# Patient Record
Sex: FEMALE | Race: ASIAN | NOT HISPANIC OR LATINO | URBAN - METROPOLITAN AREA
[De-identification: names, ages, dates, MRNs, and addresses within clinical notes are randomized per-mention and may not be internally consistent; named-entity substitution may affect disease eponyms.]

---

## 2022-08-07 ENCOUNTER — EMERGENCY (EMERGENCY)
Facility: HOSPITAL | Age: 47
LOS: 1 days | Discharge: HOME | End: 2022-08-07
Attending: EMERGENCY MEDICINE
Payer: SELF-PAY

## 2022-08-07 VITALS
OXYGEN SATURATION: 99 % | RESPIRATION RATE: 15 BRPM | HEART RATE: 71 BPM | SYSTOLIC BLOOD PRESSURE: 155 MMHG | DIASTOLIC BLOOD PRESSURE: 91 MMHG | TEMPERATURE: 98 F

## 2022-08-07 VITALS
DIASTOLIC BLOOD PRESSURE: 98 MMHG | OXYGEN SATURATION: 100 % | RESPIRATION RATE: 18 BRPM | HEART RATE: 83 BPM | TEMPERATURE: 98 F | WEIGHT: 190.92 LBS | SYSTOLIC BLOOD PRESSURE: 167 MMHG | HEIGHT: 68 IN

## 2022-08-07 PROCEDURE — 99284 EMERGENCY DEPT VISIT MOD MDM: CPT

## 2022-08-07 PROCEDURE — 82962 GLUCOSE BLOOD TEST: CPT

## 2022-08-07 PROCEDURE — 99053 MED SERV 10PM-8AM 24 HR FAC: CPT

## 2022-08-07 RX ORDER — VALACYCLOVIR 500 MG/1
1000 TABLET, FILM COATED ORAL ONCE
Refills: 0 | Status: COMPLETED | OUTPATIENT
Start: 2022-08-07 | End: 2022-08-07

## 2022-08-07 RX ORDER — VALACYCLOVIR 500 MG/1
1 TABLET, FILM COATED ORAL
Qty: 21 | Refills: 0
Start: 2022-08-07 | End: 2022-08-13

## 2022-08-07 RX ADMIN — VALACYCLOVIR 1000 MILLIGRAM(S): 500 TABLET, FILM COATED ORAL at 03:39

## 2022-08-07 RX ADMIN — Medication 60 MILLIGRAM(S): at 03:40

## 2022-08-07 NOTE — ED ADULT TRIAGE NOTE - CHIEF COMPLAINT QUOTE
L sided facial droop first noticed approx 2230 after dripping water out of mouth around 2030, states was normal prior

## 2022-08-07 NOTE — ED PROVIDER NOTE - NSFOLLOWUPINSTRUCTIONS_ED_ALL_ED_FT
(1) Follow up with your primary care physician as discussed. In addition, we did not find evidence of a life threatening illness on your testing here today. Please call your physician or [phone] to set up the necessary appointments.  (2) Immediately seek care at your nearest emergency room if your symptoms worsen, persist, or do not resolve. This includes new weakness, vision changes, dizziness.  (3) Take prednisone 3 pills once a day and valacyclovir 1 pill 3 times per day for 1 week as instructed.  (4) You can use over the counter eye patch and visine for your affected eye to keep it from drying out.      Malcolm's Palsy, Adult       Bell's palsy is a short-term inability to move muscles in a part of the face. The inability to move, also called paralysis, results from inflammation or compression of the seventh cranial nerve. This nerve travels along the skull and under the ear to the side of the face. This nerve is responsible for facial movements that include blinking, closing the eyes, smiling, and frowning.      What are the causes?    The exact cause of this condition is not known. It may be caused by an infection from a virus, such as the chickenpox (herpes zoster), Claire–Barr, or mumps virus.      What increases the risk?    You are more likely to develop this condition if:  •You are pregnant.      •You have diabetes.      •You have had a recent infection in your nose, throat, or airways.      •You have a weakened body defense system (immune system).      •You have had a facial injury, such as a fracture.      •You have a family history of Bell's palsy.        What are the signs or symptoms?    Symptoms of this condition include:  •Weakness on one side of the face.      •Drooping eyelid and corner of the mouth.      •Excessive tearing in one eye.      •Difficulty closing the eyelid.      •Dry eye.      •Drooling.      •Dry mouth.      •Changes in taste.      •Change in facial appearance.      •Pain behind one ear.      •Ringing in one or both ears.      •Sensitivity to sound in one ear.      •Facial twitching.      •Headache.      •Impaired speech.      •Dizziness.      •Difficulty eating or drinking.      Most of the time, only one side of the face is affected. In rare cases, Bell's palsy may affect the whole face.      How is this diagnosed?    This condition is diagnosed based on:  •Your symptoms.      •Your medical history.      •A physical exam.      You may also have to see health care providers who specialize in disorders of the nerves (neurologist) or diseases and conditions of the eye (ophthalmologist). You may have tests, such as:  •A test to check for nerve damage (electromyogram).      •Imaging studies, such as a CT scan or an MRI.      •Blood tests.        How is this treated?    This condition affects every person differently. Sometimes symptoms go away without treatment within a couple weeks. If treatment is needed, it varies from person to person. The goal of treatment is to reduce inflammation and protect the eye from damage. Treatment for Bell's palsy may include:•Medicines, such as:  •Steroids to reduce swelling and inflammation.      •Antiviral medicines.      •Pain relievers, including aspirin, acetaminophen, or ibuprofen.        •Eye drops or ointment to keep your eye moist.      •Eye protection, if you cannot close your eye.      •Exercises or massage to regain muscle strength and function (physical therapy).        Follow these instructions at home:     •Take over-the-counter and prescription medicines only as told by your health care provider.    •If your eye is affected:  •Keep your eye moist with eye drops or ointment as told by your health care provider.      •Follow instructions for eye care and protection as told by your health care provider.        •Do any physical therapy exercises as told by your health care provider.      •Keep all follow-up visits. This is important.        Contact a health care provider if:    •You have a fever or chills.      •Your symptoms do not get better within 2–3 weeks, or your symptoms get worse.      •Your eye is red, irritated, or painful.      •You have new symptoms.        Get help right away if:    •You have weakness or numbness in a part of your body other than your face.      •You have trouble swallowing.      •You develop neck pain or stiffness.      •You develop dizziness or shortness of breath.        Summary    •Bell's palsy is a short-term inability to move muscles in a part of the face. The inability to move results from inflammation or compression of the facial nerve.      •This condition affects every person differently. Sometimes symptoms go away without treatment within a couple weeks.      •If treatment is needed, it varies from person to person. The goal of treatment is to reduce inflammation and protect the eye from damage.      •Contact your health care provider if your symptoms do not get better within 2–3 weeks, or your symptoms get worse.

## 2022-08-07 NOTE — ED PROVIDER NOTE - ATTENDING CONTRIBUTION TO CARE
attending Kraig: 46yF h/o prediabetes, celiac disease, HTN, hypothyroid p/w R sided facial droop since 10:30pm. Denies extremity weakness. Also with 2 days R ear pain with mild assoc headache. Exam as above. Bell's palsy. Will treat with steroids and antiviral, dc with close outpatient follow-up and strict return precautions.

## 2022-08-07 NOTE — ED PROVIDER NOTE - PATIENT PORTAL LINK FT
You can access the FollowMyHealth Patient Portal offered by [facility] by registering at the following website: [email or web address]. By joining [facility]’s FollowMyHealth portal, you will also be able to view your health information using other applications (apps) compatible with our system.

## 2022-08-07 NOTE — ED PROVIDER NOTE - OBJECTIVE STATEMENT
Patient is a 47 yo F with PMH prediabetes, celiac disease, HTN, hypothyroid presenting with R facial droop. Began at 10:30pm noticed while drinking, spilled water out of side of mouth, feels unable to move Patient is a 47 yo F with PMH prediabetes, celiac disease, HTN, hypothyroid presenting with R facial droop. Began at 10:30pm noticed while drinking, spilled water out of side of mouth, feels unable to move R upper and lower face as much as L side. No changes to vision or hearing, no sensory changes. Patient espouses R ear pain 2 days ago with mild headache, no fevers or chills, no drainage or pus. Currently feels otherwise well with no complaints, no weakness or dizziness.

## 2022-08-07 NOTE — ED ADULT NURSE NOTE - NSIMPLEMENTINTERV_GEN_ALL_ED
Implemented All Universal Safety Interventions:  [place] to call system. Call bell, personal items and telephone within reach. Instruct patient to call for assistance. Room bathroom lighting operational. Non-slip footwear when patient is off stretcher. Physically safe environment: no spills, clutter or unnecessary equipment. Stretcher in lowest position, wheels locked, appropriate side rails in place.

## 2022-08-07 NOTE — ED PROVIDER NOTE - PHYSICAL EXAMINATION
CONSTITUTIONAL: Well-developed; well-nourished; in no acute distress.   SKIN: warm, dry  HEAD: Normocephalic; atraumatic.   EYES: no conjunctival injection. PERRL.   ENT: No nasal discharge; airway clear. R ear with ear canal erythema, TM nondistended, no erythema, no drainage.  NECK: Supple; non tender.  CARD: S1, S2 normal; no murmurs, gallops, or rubs. Regular rate and rhythm.   RESP: No wheezes, rales or rhonchi. Good air movement bilaterally.   ABD: soft ntnd, no guarding, no distention, no rigidity.   EXT: Ambulates independently.  No cyanosis or edema.   LYMPH: No acute cervical adenopathy.  NEURO: Alert, oriented. R facial muscle weakness to smile and eyebrow raise including top and bottom half of face. No L side weakness. Other cranial nerves intact. No motor or sensory deficits in extremities.   PSYCH: Cooperative, appropriate.

## 2022-08-07 NOTE — ED PROVIDER NOTE - CLINICAL SUMMARY MEDICAL DECISION MAKING FREE TEXT BOX
Patient is a 45 yo F with PMH prediabetes, celiac disease, HTN, hypothyroid presenting with R facial droop. Weakness to both upper and lower R half is more specific for facial palsy and unlikely to be central in origin, unlikely stroke. No other neurological findings. Etiology may be related to suspected recent infectious illness involving R ear. Will discharge with prednisone and valacyclovir, follow up with PCP, return precautions. For R eye not closing, will recommend eyepatch and visine.

## 2022-12-09 NOTE — ED PROVIDER NOTE - NSICDXPASTMEDICALHX_GEN_ALL_CORE_FT
PAST MEDICAL HISTORY:  History of celiac disease     HTN (hypertension)     Hypothyroidism     Prediabetes     
51.3